# Patient Record
Sex: MALE | Race: WHITE | NOT HISPANIC OR LATINO | URBAN - METROPOLITAN AREA
[De-identification: names, ages, dates, MRNs, and addresses within clinical notes are randomized per-mention and may not be internally consistent; named-entity substitution may affect disease eponyms.]

---

## 2017-01-12 ENCOUNTER — OUTPATIENT (OUTPATIENT)
Dept: OUTPATIENT SERVICES | Facility: HOSPITAL | Age: 52
LOS: 1 days | End: 2017-01-12
Payer: MEDICARE

## 2017-01-12 DIAGNOSIS — K08.9 DISORDER OF TEETH AND SUPPORTING STRUCTURES, UNSPECIFIED: ICD-10-CM

## 2017-01-12 PROCEDURE — D1110: CPT

## 2017-01-17 DIAGNOSIS — Z01.20 ENCOUNTER FOR DENTAL EXAMINATION AND CLEANING WITHOUT ABNORMAL FINDINGS: ICD-10-CM

## 2017-02-22 ENCOUNTER — OUTPATIENT (OUTPATIENT)
Dept: OUTPATIENT SERVICES | Facility: HOSPITAL | Age: 52
LOS: 1 days | End: 2017-02-22
Payer: MEDICARE

## 2017-02-22 DIAGNOSIS — K08.9 DISORDER OF TEETH AND SUPPORTING STRUCTURES, UNSPECIFIED: ICD-10-CM

## 2017-02-22 PROCEDURE — D7140: CPT

## 2017-02-23 DIAGNOSIS — K02.9 DENTAL CARIES, UNSPECIFIED: ICD-10-CM

## 2017-03-15 ENCOUNTER — OUTPATIENT (OUTPATIENT)
Dept: OUTPATIENT SERVICES | Facility: HOSPITAL | Age: 52
LOS: 1 days | End: 2017-03-15

## 2017-03-17 DIAGNOSIS — K08.9 DISORDER OF TEETH AND SUPPORTING STRUCTURES, UNSPECIFIED: ICD-10-CM

## 2017-03-29 ENCOUNTER — OUTPATIENT (OUTPATIENT)
Dept: OUTPATIENT SERVICES | Facility: HOSPITAL | Age: 52
LOS: 1 days | End: 2017-03-29
Payer: MEDICARE

## 2017-03-29 DIAGNOSIS — K08.9 DISORDER OF TEETH AND SUPPORTING STRUCTURES, UNSPECIFIED: ICD-10-CM

## 2017-03-29 PROCEDURE — D7140: CPT

## 2017-03-31 DIAGNOSIS — K02.9 DENTAL CARIES, UNSPECIFIED: ICD-10-CM

## 2017-04-05 ENCOUNTER — OUTPATIENT (OUTPATIENT)
Dept: OUTPATIENT SERVICES | Facility: HOSPITAL | Age: 52
LOS: 1 days | End: 2017-04-05
Payer: MEDICARE

## 2017-04-05 DIAGNOSIS — K02.9 DENTAL CARIES, UNSPECIFIED: ICD-10-CM

## 2017-04-05 DIAGNOSIS — K08.9 DISORDER OF TEETH AND SUPPORTING STRUCTURES, UNSPECIFIED: ICD-10-CM

## 2017-04-05 PROCEDURE — D7140: CPT

## 2017-04-12 ENCOUNTER — OUTPATIENT (OUTPATIENT)
Dept: OUTPATIENT SERVICES | Facility: HOSPITAL | Age: 52
LOS: 1 days | End: 2017-04-12

## 2017-04-12 DIAGNOSIS — K08.9 DISORDER OF TEETH AND SUPPORTING STRUCTURES, UNSPECIFIED: ICD-10-CM

## 2017-05-04 ENCOUNTER — OUTPATIENT (OUTPATIENT)
Dept: OUTPATIENT SERVICES | Facility: HOSPITAL | Age: 52
LOS: 1 days | End: 2017-05-04

## 2017-05-09 DIAGNOSIS — K08.9 DISORDER OF TEETH AND SUPPORTING STRUCTURES, UNSPECIFIED: ICD-10-CM

## 2017-09-28 ENCOUNTER — OUTPATIENT (OUTPATIENT)
Dept: OUTPATIENT SERVICES | Facility: HOSPITAL | Age: 52
LOS: 1 days | End: 2017-09-28
Payer: MEDICARE

## 2017-09-28 DIAGNOSIS — K08.9 DISORDER OF TEETH AND SUPPORTING STRUCTURES, UNSPECIFIED: ICD-10-CM

## 2017-09-28 PROCEDURE — D1110: CPT

## 2017-09-28 PROCEDURE — D0230: CPT

## 2017-09-28 PROCEDURE — D0120: CPT

## 2017-09-28 PROCEDURE — D0220: CPT

## 2017-09-28 PROCEDURE — D0272: CPT

## 2017-10-02 DIAGNOSIS — Z01.20 ENCOUNTER FOR DENTAL EXAMINATION AND CLEANING WITHOUT ABNORMAL FINDINGS: ICD-10-CM

## 2021-07-04 ENCOUNTER — EMERGENCY (EMERGENCY)
Facility: HOSPITAL | Age: 56
LOS: 1 days | Discharge: HOME | End: 2021-07-04
Attending: EMERGENCY MEDICINE | Admitting: EMERGENCY MEDICINE
Payer: MEDICARE

## 2021-07-04 VITALS
WEIGHT: 186.95 LBS | DIASTOLIC BLOOD PRESSURE: 77 MMHG | RESPIRATION RATE: 16 BRPM | HEIGHT: 70.5 IN | TEMPERATURE: 97 F | SYSTOLIC BLOOD PRESSURE: 153 MMHG | OXYGEN SATURATION: 97 % | HEART RATE: 72 BPM

## 2021-07-04 LAB
ANION GAP SERPL CALC-SCNC: 5 MMOL/L — SIGNIFICANT CHANGE UP (ref 5–17)
BASOPHILS # BLD AUTO: 0.05 K/UL — SIGNIFICANT CHANGE UP (ref 0–0.2)
BASOPHILS NFR BLD AUTO: 0.5 % — SIGNIFICANT CHANGE UP (ref 0–2)
BUN SERPL-MCNC: 15 MG/DL — SIGNIFICANT CHANGE UP (ref 7–23)
CALCIUM SERPL-MCNC: 11.2 MG/DL — HIGH (ref 8.4–10.5)
CHLORIDE SERPL-SCNC: 102 MMOL/L — SIGNIFICANT CHANGE UP (ref 96–108)
CO2 SERPL-SCNC: 27 MMOL/L — SIGNIFICANT CHANGE UP (ref 22–31)
CREAT SERPL-MCNC: 0.97 MG/DL — SIGNIFICANT CHANGE UP (ref 0.5–1.3)
EOSINOPHIL # BLD AUTO: 0.38 K/UL — SIGNIFICANT CHANGE UP (ref 0–0.5)
EOSINOPHIL NFR BLD AUTO: 4.2 % — SIGNIFICANT CHANGE UP (ref 0–6)
GLUCOSE SERPL-MCNC: 101 MG/DL — HIGH (ref 70–99)
HCT VFR BLD CALC: 40.5 % — SIGNIFICANT CHANGE UP (ref 39–50)
HGB BLD-MCNC: 14 G/DL — SIGNIFICANT CHANGE UP (ref 13–17)
IMM GRANULOCYTES NFR BLD AUTO: 0.7 % — SIGNIFICANT CHANGE UP (ref 0–1.5)
LYMPHOCYTES # BLD AUTO: 1.97 K/UL — SIGNIFICANT CHANGE UP (ref 1–3.3)
LYMPHOCYTES # BLD AUTO: 21.6 % — SIGNIFICANT CHANGE UP (ref 13–44)
MCHC RBC-ENTMCNC: 31.7 PG — SIGNIFICANT CHANGE UP (ref 27–34)
MCHC RBC-ENTMCNC: 34.6 GM/DL — SIGNIFICANT CHANGE UP (ref 32–36)
MCV RBC AUTO: 91.8 FL — SIGNIFICANT CHANGE UP (ref 80–100)
MONOCYTES # BLD AUTO: 0.78 K/UL — SIGNIFICANT CHANGE UP (ref 0–0.9)
MONOCYTES NFR BLD AUTO: 8.6 % — SIGNIFICANT CHANGE UP (ref 2–14)
NEUTROPHILS # BLD AUTO: 5.87 K/UL — SIGNIFICANT CHANGE UP (ref 1.8–7.4)
NEUTROPHILS NFR BLD AUTO: 64.4 % — SIGNIFICANT CHANGE UP (ref 43–77)
NRBC # BLD: 0 /100 WBCS — SIGNIFICANT CHANGE UP (ref 0–0)
PLATELET # BLD AUTO: 193 K/UL — SIGNIFICANT CHANGE UP (ref 150–400)
POTASSIUM SERPL-MCNC: 4.5 MMOL/L — SIGNIFICANT CHANGE UP (ref 3.5–5.3)
POTASSIUM SERPL-SCNC: 4.5 MMOL/L — SIGNIFICANT CHANGE UP (ref 3.5–5.3)
RBC # BLD: 4.41 M/UL — SIGNIFICANT CHANGE UP (ref 4.2–5.8)
RBC # FLD: 13.3 % — SIGNIFICANT CHANGE UP (ref 10.3–14.5)
SODIUM SERPL-SCNC: 134 MMOL/L — LOW (ref 135–145)
WBC # BLD: 9.11 K/UL — SIGNIFICANT CHANGE UP (ref 3.8–10.5)
WBC # FLD AUTO: 9.11 K/UL — SIGNIFICANT CHANGE UP (ref 3.8–10.5)

## 2021-07-04 PROCEDURE — 36415 COLL VENOUS BLD VENIPUNCTURE: CPT

## 2021-07-04 PROCEDURE — 73562 X-RAY EXAM OF KNEE 3: CPT | Mod: 26,LT

## 2021-07-04 PROCEDURE — G1004: CPT

## 2021-07-04 PROCEDURE — 90715 TDAP VACCINE 7 YRS/> IM: CPT

## 2021-07-04 PROCEDURE — 70450 CT HEAD/BRAIN W/O DYE: CPT | Mod: MF

## 2021-07-04 PROCEDURE — 73030 X-RAY EXAM OF SHOULDER: CPT

## 2021-07-04 PROCEDURE — 12013 RPR F/E/E/N/L/M 2.6-5.0 CM: CPT

## 2021-07-04 PROCEDURE — 99283 EMERGENCY DEPT VISIT LOW MDM: CPT | Mod: 25

## 2021-07-04 PROCEDURE — 93010 ELECTROCARDIOGRAM REPORT: CPT

## 2021-07-04 PROCEDURE — 96360 HYDRATION IV INFUSION INIT: CPT | Mod: XU

## 2021-07-04 PROCEDURE — 73030 X-RAY EXAM OF SHOULDER: CPT | Mod: 26,LT

## 2021-07-04 PROCEDURE — 85025 COMPLETE CBC W/AUTO DIFF WBC: CPT

## 2021-07-04 PROCEDURE — 70450 CT HEAD/BRAIN W/O DYE: CPT | Mod: 26,MF

## 2021-07-04 PROCEDURE — 13132 CMPLX RPR F/C/C/M/N/AX/G/H/F: CPT

## 2021-07-04 PROCEDURE — 73020 X-RAY EXAM OF SHOULDER: CPT

## 2021-07-04 PROCEDURE — 93005 ELECTROCARDIOGRAM TRACING: CPT

## 2021-07-04 PROCEDURE — 73562 X-RAY EXAM OF KNEE 3: CPT

## 2021-07-04 PROCEDURE — 80048 BASIC METABOLIC PNL TOTAL CA: CPT

## 2021-07-04 PROCEDURE — 90471 IMMUNIZATION ADMIN: CPT

## 2021-07-04 PROCEDURE — 99284 EMERGENCY DEPT VISIT MOD MDM: CPT | Mod: 25

## 2021-07-04 RX ORDER — DIVALPROEX SODIUM 500 MG/1
250 TABLET, DELAYED RELEASE ORAL ONCE
Refills: 0 | Status: COMPLETED | OUTPATIENT
Start: 2021-07-04 | End: 2021-07-04

## 2021-07-04 RX ORDER — TETANUS TOXOID, REDUCED DIPHTHERIA TOXOID AND ACELLULAR PERTUSSIS VACCINE, ADSORBED 5; 2.5; 8; 8; 2.5 [IU]/.5ML; [IU]/.5ML; UG/.5ML; UG/.5ML; UG/.5ML
0.5 SUSPENSION INTRAMUSCULAR ONCE
Refills: 0 | Status: COMPLETED | OUTPATIENT
Start: 2021-07-04 | End: 2021-07-04

## 2021-07-04 RX ORDER — SODIUM CHLORIDE 9 MG/ML
500 INJECTION INTRAMUSCULAR; INTRAVENOUS; SUBCUTANEOUS ONCE
Refills: 0 | Status: COMPLETED | OUTPATIENT
Start: 2021-07-04 | End: 2021-07-04

## 2021-07-04 RX ORDER — DIVALPROEX SODIUM 500 MG/1
500 TABLET, DELAYED RELEASE ORAL ONCE
Refills: 0 | Status: COMPLETED | OUTPATIENT
Start: 2021-07-04 | End: 2021-07-04

## 2021-07-04 RX ADMIN — SODIUM CHLORIDE 1000 MILLILITER(S): 9 INJECTION INTRAMUSCULAR; INTRAVENOUS; SUBCUTANEOUS at 08:26

## 2021-07-04 RX ADMIN — TETANUS TOXOID, REDUCED DIPHTHERIA TOXOID AND ACELLULAR PERTUSSIS VACCINE, ADSORBED 0.5 MILLILITER(S): 5; 2.5; 8; 8; 2.5 SUSPENSION INTRAMUSCULAR at 08:24

## 2021-07-04 RX ADMIN — DIVALPROEX SODIUM 500 MILLIGRAM(S): 500 TABLET, DELAYED RELEASE ORAL at 08:25

## 2021-07-04 RX ADMIN — DIVALPROEX SODIUM 250 MILLIGRAM(S): 500 TABLET, DELAYED RELEASE ORAL at 08:25

## 2021-07-04 RX ADMIN — SODIUM CHLORIDE 500 MILLILITER(S): 9 INJECTION INTRAMUSCULAR; INTRAVENOUS; SUBCUTANEOUS at 09:00

## 2021-07-04 NOTE — ED PROVIDER NOTE - OBJECTIVE STATEMENT
Pt juan c, has hx seizures. Lives in group home, visiting parents for weekend. pt had seizure and fell down a few steps at 7am. had loc. post ictal as per mom and ems upon their arrival. mom states patient last seizure was few weeks ago. pt takes all meds, has not taken this morning meds, scheduled for 8am. pt c/o bleeding and pain over right eyebrow and left shoulder and knee pain. does not remember falling, states he had a seizure. no fever, chills, travel. no cp, no sob. pt at baseline as per mom. tdap unknown. Pt juan c, has hx seizures. Lives in group home, visiting parents for weekend. pt's mom heard noise and pt was at bottom of a few steps at 7am. had loc. post ictal as per mom and ems upon their arrival. mom states patient last seizure was few weeks ago. pt takes all meds, has not taken this morning meds, scheduled for 8am. pt c/o bleeding and pain over right eyebrow and left shoulder and knee pain. does not remember falling, states he had a seizure. no fever, chills, travel. no cp, no sob. pt at baseline as per mom. tdap unknown.

## 2021-07-04 NOTE — ED PROVIDER NOTE - NS ED ROS FT
Except as otherwise indicated in HPI:  CONSTITUTIONAL: Neg  HEENT: neg  CV: neg  Resp: neg  GI: Neg  : Neg  MSK: Neg  SKIN: Neg  NEURO: seizure  PSYCHIATRIC: Neg  Heme/Onc: Neg

## 2021-07-04 NOTE — ED PROVIDER NOTE - PROGRESS NOTE DETAILS
pt has not taken 8am meds but episode was at 7am; pt takes vimpat 100mg bid (8a, 8p) and depakote 500mg tid (8a, 4p, 8p) took all meds last night   pt will take all 8am meds here (brought labeled from home) and will give depakote in ED (pt doesn't have any left, was going back to group home for meds, didn't send enough) and will give extra 250mg depakote for total dose 750mg

## 2021-07-04 NOTE — ED PROVIDER NOTE - PATIENT PORTAL LINK FT
You can access the FollowMyHealth Patient Portal offered by [facility] by registering at the following website: [email or web address]. By joining [employer]’s FollowMyHealth portal, you will also be able to view your health information using other applications (apps) compatible with our system.

## 2021-07-04 NOTE — ED PROCEDURE NOTE - PROCEDURE ADDITIONAL DETAILS
2 chromic gut fascia sutures interrupted  7 skin in terrupted 5-0 nylon sutures  steristrips applied

## 2021-07-04 NOTE — ED PROVIDER NOTE - CLINICAL SUMMARY MEDICAL DECISION MAKING FREE TEXT BOX
Pt juan c, has hx seizures. Lives in group home, visiting parents for weekend. pt had seizure and fell down a few steps at 7am. had loc. post ictal as per mom and ems upon their arrival. mom states patient last seizure was few weeks ago. pt takes all meds, has not taken this morning meds, scheduled for 8am. pt c/o bleeding and pain over right eyebrow and left shoulder and knee pain. does not remember falling, states he had a seizure. no fever, chills, travel. no cp, no sob. pt at baseline as per mom.    laceration repair, tdap, ct brain, xrays, seizure medication  re-assess, likely d/c back to home  pt mom at bedside Pt juan c, has hx seizures. Lives in group home, visiting parents for weekend. pt's mom heard noise and pt was at bottom of a few steps at 7am. had loc. post ictal as per mom and ems upon their arrival. mom states patient last seizure was few weeks ago. pt takes all meds, has not taken this morning meds, scheduled for 8am. pt c/o bleeding and pain over right eyebrow and left shoulder and knee pain. does not remember falling, states he had a seizure. no fever, chills, travel. no cp, no sob. pt at baseline as per mom. tdap unknown.    laceration repair, tdap, ct brain, xrays, seizure medication  re-assess, likely d/c back to home  pt mom at bedside

## 2021-07-04 NOTE — ED PROVIDER NOTE - PHYSICAL EXAMINATION
Gen:  alert, awake, no acute distress  Head:  normocephalic; 3cm laceration right eyebrow  HEENT: PERRLA, EOMI, normal nose, normal oropharynx, no tonsillar edema, erythema, or exudate  CV:  rrr, nl S1, S2, no m/r/g  Pulm:  lungs CTA b/l  Abd: s/nt/nd, +BS  MSK:  moving all extremities, full rom left shoulder, mild anterior ttp; no back midline ttp, no stepoffs, no cva TTP; abrasion over left knee, full rom  Neuro:  grossly intact, no focal deficits  Skin:  clear, dry, intact  Psych: AOx3, normal affect, no apparent risk to self or others Gen:  alert, awake, no acute distress  Head:  normocephalic; 3cm laceration right eyebrow  HEENT: PERRLA, EOMI, normal nose, normal oropharynx, no tonsillar edema, erythema, or exudate  CV:  rrr, nl S1, S2, no m/r/g  Pulm:  lungs CTA b/l  Abd: s/nt/nd, +BS  MSK:  moving all extremities, full rom left shoulder, mild anterior ttp; no back midline ttp, no stepoffs, no cva TTP; abrasion over left knee medial patella, full rom; no ttp tib/fib  Neuro:  grossly intact, no focal deficits  Skin:  clear, dry, intact  Psych: AOx3, normal affect, no apparent risk to self or others Gen:  alert, awake, no acute distress  Head:  normocephalic; 3cm laceration right eyebrow  HEENT: PERRLA, EOMI, normal nose, normal oropharynx, no tonsillar edema, erythema, or exudate; right upper ear deyanira hematoma (unchanged during visit, non expanding)  CV:  rrr, nl S1, S2, no m/r/g  Pulm:  lungs CTA b/l  Abd: s/nt/nd, +BS  MSK:  moving all extremities, full rom left shoulder, mild anterior ttp; no back midline ttp, no stepoffs, no cva TTP; abrasion over left knee medial patella, full rom; no ttp tib/fib  Neuro:  grossly intact, no focal deficits  Skin:  clear, dry, intact  Psych: AOx3, normal affect, no apparent risk to self or others

## 2021-07-04 NOTE — ED ADULT NURSE NOTE - NS ED NOTE  TALK SOMEONE YN
Visit Information Date & Time Provider Department Dept. Phone Encounter #  
 10/12/2017  8:00 AM Zari Forde MD [facility] [address]  [id number] Your Appointments 10/18/2017  3:00 PM  
ECHO CARDIOGRAMS 2D with ECHO, STFRANCIS  
[facility] ([place] SCHEDULING) Appt Note: stress echo with BP chk pls tell Brent Mal [address] [facility]  
[phone]  
  
   
 [address] [address]  
  
    
 10/18/2017  3:00 PM  
STRESS ECHOCARDIOGRAMS with SLOANE GIANG  
[facility] ([facility]) Appt Note: stress echo with BP chk pls tell Crouse Mal [address] [place] [address]  
[phone]  
  
   
 [address] [facility] [address] [address] [address] [place]  
  
    
 11/28/2017  1:40 PM  
Follow Up with Meghan Gómez PsyD [address] [place] [address] ([facility]) Appt Note: office feedback. ..[address] [place] [address] [phone] [address] [address]  
  
    
 12/7/2017  9:40 AM  
ESTABLISHED PATIENT with Kelly Ballard MD  
[facility] ([place] SCHEDULING) Appt Note: 6 wk fup  
 [address] [facility] [address]  
[address] [facility] [address]  
  
    
 10/9/2018  8:30 AM  
ROUTINE CARE with MD Danyell [facility]) Appt Note: 1yr f/u thyroid cp0.00  
 [address] [place] [address]  
[address] [address] [facility] [address] [address] Upcoming Health Maintenance Date Due  
 PAP AKA CERVICAL CYTOLOGY 7/23/2018 BREAST CANCER SCRN MAMMOGRAM 9/18/2019 DTaP/Tdap/Td series (2 - Td) 10/26/2019 COLONOSCOPY 11/1/2019 Allergies as of 10/12/2017  Review Complete On: 10/12/2017 By: Edson Torres MD  
  
 Severity Noted Reaction Type Reactions Adhesive  09/14/2009    Hives Aloe Vera  08/02/2017    Hives Corticosteroids (Glucocorticoids)  06/19/2015    Rash Cymbalta [Duloxetine]  11/14/2012   Systemic Vertigo Pt gets vertigo Darvon [Propoxyphene]  09/14/2009    Rash Lyrica [Pregabalin]  08/02/2012   Side Effect Vertigo Other Medication  04/09/2014    Other (comments) Steroid injections caused facial redness Oxycodone  04/09/2014    Other (comments)  
 hallucinations Pcn [Penicillins]  09/14/2009    Contact Dermatitis OK with Keflex/Ancef 4/16/14 Prednisone  11/14/2012   Systemic Rash Tetracycline  09/14/2009    Other (comments)  
 headache Tramadol  12/30/2014    Rash Vancomycin  12/30/2014    Rash  
 redness Dilaudid [Hydromorphone (Pf)] Low 03/07/2016   Side Effect Nausea and Vomiting, Vertigo Current Immunizations  Reviewed on 5/19/2016 Name Date PPD 10/26/2009 TDAP Vaccine 10/26/2009 Not reviewed this visit You Were Diagnosed With   
  
 Codes Comments Severe depression ([facility]    -  Primary ICD-10-CM: F32.2 ICD-9-CM: 932 Anxiety     ICD-10-CM: F41.9 ICD-9-CM: 300.00 Attention and concentration deficit     ICD-10-CM: R41.840 ICD-9-CM: 799.51 Somatization disorder     ICD-10-CM: F45.0 ICD-9-CM: 300.81 Somnolence, daytime     ICD-10-CM: R40.0 ICD-9-CM: 780.54 Vitals BP Pulse Temp Resp Height(growth percentile) Weight(growth percentile) 106/72 (BP 1 Location: Left arm, BP Patient Position: Sitting) 63 97.8 °F (36.6 °C) (Oral) 12 5' 3\" (1.6 m) 168 lb 12.8 oz (76.6 kg) BMI OB Status Smoking Status 29.9 kg/m2 Hysterectomy Former Smoker Vitals History BMI and BSA Data  Body Mass Index Body Surface Area  
 29.9 kg/m 2 1.85 m 2  
  
 Preferred Pharmacy Pharmacy Name Phone [address] [facility] [phone] Your Updated Medication List  
  
   
This list is accurate as of: 10/12/17  8:43 AM.  Always use your most recent med list.  
  
  
  
  
 albuterol 90 mcg/actuation inhaler Commonly known as:  PROAIR HFA Take 2 Puffs by inhalation every four (4) hours as needed for Wheezing or Shortness of Breath. atorvastatin 10 mg tablet Commonly known as:  LIPITOR Cholecalciferol (Vitamin D3) 50,000 unit Cap Take  by mouth two (2) times a week. diclofenac EC 75 mg EC tablet Commonly known as:  VOLTAREN Take 75 mg by mouth two (2) times a day. liothyronine 5 mcg tablet Commonly known as:  CYTOMEL Take 1 Tab by mouth daily. midodrine 2.5 mg tablet Commonly known as:  Melissa Nailer Take 1 Tab by mouth two (2) times a day for 30 days. pantoprazole 40 mg tablet Commonly known as:  PROTONIX Take 1 Tab by mouth two (2) times a day. Indications: gastroesophageal reflux disease PLAQUENIL 200 mg tablet Generic drug:  hydroxychloroquine Take 200 mg by mouth two (2) times a day. TIROSINT 75 mcg Cap Generic drug:  Levothyroxine TAKE 1 CAPSULE DAILY Introducing [facility]! Dear TERI WILSON [facility]: Thank you for requesting a [employer] account. Our records indicate that you already have an active [employer] account. You can access your account anytime at [email or web address] Did you know that you can access your hospital and ER discharge instructions at any time in [employer] You can also review all of your test results from your hospital stay or ER visit. Additional Information If you have questions, please visit the Frequently Asked Questions section of the [employer] website at [email or web address] Remember, [employer] is NOT to be used for urgent needs.  For medical emergencies, dial 911. Now available from your iPhone and Android! Please provide this summary of care documentation to your next provider. Your primary care clinician is listed as Tiffany Johnson. If you have any questions after today's visit, please call [phone]. No

## 2021-07-04 NOTE — ED ADULT NURSE NOTE - NSIMPLEMENTINTERV_GEN_ALL_ED
Implemented All Fall Risk Interventions:  [place] to call system. Call bell, personal items and telephone within reach. Instruct patient to call for assistance. Room bathroom lighting operational. Non-slip footwear when patient is off stretcher. Physically safe environment: no spills, clutter or unnecessary equipment. Stretcher in lowest position, wheels locked, appropriate side rails in place. Provide visual cue, wrist band, yellow gown, etc. Monitor gait and stability. Monitor for mental status changes and reorient to person, place, and time. Review medications for side effects contributing to fall risk. Reinforce activity limits and safety measures with patient and family.

## 2021-07-04 NOTE — ED PROVIDER NOTE - CARE PLAN
Principal Discharge DX:	Seizure  Secondary Diagnosis:	Laceration of eyebrow and forehead, right, initial encounter

## 2021-07-04 NOTE — ED PROVIDER NOTE - NSFOLLOWUPINSTRUCTIONS_ED_ALL_ED_FT
Seizure, Adult      A seizure is a sudden burst of abnormal electrical activity in the brain. Seizures usually last from 30 seconds to 2 minutes. They can cause many different symptoms.    Usually, seizures are not harmful unless they last a long time.      What are the causes?  Common causes of this condition include:  •Fever or infection.    •Conditions that affect the brain, such as:  •A brain or head injury.      •Bleeding in the brain.      •A tumor.      •Stroke.         •Low blood sugar.       •Conditions that are passed from parent to child (are inherited).    •Problems with substances, such as:  •Having a reaction to a drug or a medicine.      •Suddenly stopping the use of a substance (withdrawal).        •Disorders such as autism or cerebral palsy.      In some cases, the cause may not be known. A person who has repeated seizures over time without a clear cause has a condition called epilepsy.      What increases the risk?  You are more likely to get this condition if you have:  •A family history of epilepsy.       •Had a seizure in the past.      •A history of head injury, lack of oxygen at birth, or strokes.        What are the signs or symptoms?    There are many types of seizures. The symptoms vary depending on the type of seizure you have.    Symptoms during a seizure     •Shaking (convulsions).      •Stiffness in the body.      •Passing out, or losing consciousness.      •Head nodding.      •Staring.       •Not responding to sound or touch.      •Loss of bladder control and bowel control.       Symptoms before a seizure     •Fear.      •Worry (anxiety).      •Feeling like you may vomit.      •Feeling like the room is spinning (vertigo).      •Feeling like you saw or heard something before (déjà vu).      •Odd tastes or smells.      •Changes in how you see. You may see flashing lights or spots.      Symptoms after a seizure     •Confusion.       •Sleepiness.       •Headache.       •Weakness on one side of the body.        How is this treated?  Most seizures will stop on their own in under 5 minutes. In these cases, no treatment is needed. Seizures that last longer than 5 minutes will usually need treatment. Treatment can include:  •Medicines given through an IV tube.      •Avoiding things that are known to cause your seizures. These can include medicines that you take for another condition.      •Medicines to treat epilepsy.      •Surgery to stop the seizures. This may be needed if medicines do not help.        Follow these instructions at home:    Medicines     •Take over-the-counter and prescription medicines only as told by your doctor.      • Do not eat or drink anything that may keep your medicine from working, such as alcohol.      Activity     • Do not do any activities that would be dangerous if you had another seizure, like driving or swimming. Wait until your doctor says it is safe for you to do them.      •If you live in the [place] ask your local DMV when you can drive.      •Get plenty of rest. Lack of sleep can make seizures more likely to occur.        Teaching others   Teach friends and family what to do when you have a seizure. They should:  •Lay you on the ground.      •Protect your head and body.      •Loosen any tight clothing around your neck.      •Turn you on your side.      •Not hold you down.      •Not put anything into your mouth.    •Know whether or not you need emergency care. For example, they should get help right away if:  •You have a seizure that lasts longer than 5 minutes.      •You have several seizures that follow one another.        •Stay with you until you are better.      General instructions     •Contact your doctor each time you have a seizure.      •Avoid anything that gives you seizures.    •Keep a seizure diary. Write down:  •What you think caused each seizure.      •What you remember about each seizure.        •Keep all follow-up visits as told by your doctor. This is important.        Contact a doctor if:    •You have another seizure.      •You have seizures more often.      •There is any change in what happens during your seizures.      •You keep having seizures with treatment.      •You have symptoms of being sick or having an infection. This may make a seizure more likely to happen.        Get help right away if:  •You have a seizure that:  •Lasts longer than 5 minutes.      •Is different than seizures you had before.      •Makes it harder to breathe.      •Happens after you hurt your head.      •You have any of these symptoms after a seizure:  •Not being able to speak.      •Not being able to use a part of your body.      •Confusion.      •A bad headache.        •You have two or more seizures in a row.      •You do not wake up right after a seizure.      •You get hurt during a seizure.      These symptoms may be an emergency. Do not wait to see if the symptoms will go away. Get medical help right away. Call your local emergency services (911 in the [place] Do not drive yourself to the hospital.       Summary    •A seizure is a sudden burst of abnormal electrical activity in the brain. Seizures usually last from 30 seconds to 2 minutes. They are not harmful unless they last a long time.      •Causes of seizures include illnesses, medicines, conditions that are passed from parent to child, injury to your head, strokes, drug abuse, or growths or tumors.      • Do not eat or drink anything that may keep your medicine from working, such as alcohol.      •Teach friends and family what to do when you have a seizure.      •Contact your doctor each time you have a seizure. Seizure, Adult      A seizure is a sudden burst of abnormal electrical activity in the brain. Seizures usually last from 30 seconds to 2 minutes. They can cause many different symptoms.    Usually, seizures are not harmful unless they last a long time.      What are the causes?  Common causes of this condition include:  •Fever or infection.    •Conditions that affect the brain, such as:  •A brain or head injury.      •Bleeding in the brain.      •A tumor.      •Stroke.         •Low blood sugar.       •Conditions that are passed from parent to child (are inherited).    •Problems with substances, such as:  •Having a reaction to a drug or a medicine.      •Suddenly stopping the use of a substance (withdrawal).        •Disorders such as autism or cerebral palsy.      In some cases, the cause may not be known. A person who has repeated seizures over time without a clear cause has a condition called epilepsy.      What increases the risk?  You are more likely to get this condition if you have:  •A family history of epilepsy.       •Had a seizure in the past.      •A history of head injury, lack of oxygen at birth, or strokes.        What are the signs or symptoms?    There are many types of seizures. The symptoms vary depending on the type of seizure you have.    Symptoms during a seizure     •Shaking (convulsions).      •Stiffness in the body.      •Passing out, or losing consciousness.      •Head nodding.      •Staring.       •Not responding to sound or touch.      •Loss of bladder control and bowel control.       Symptoms before a seizure     •Fear.      •Worry (anxiety).      •Feeling like you may vomit.      •Feeling like the room is spinning (vertigo).      •Feeling like you saw or heard something before (déjà vu).      •Odd tastes or smells.      •Changes in how you see. You may see flashing lights or spots.      Symptoms after a seizure     •Confusion.       •Sleepiness.       •Headache.       •Weakness on one side of the body.        How is this treated?  Most seizures will stop on their own in under 5 minutes. In these cases, no treatment is needed. Seizures that last longer than 5 minutes will usually need treatment. Treatment can include:  •Medicines given through an IV tube.      •Avoiding things that are known to cause your seizures. These can include medicines that you take for another condition.      •Medicines to treat epilepsy.      •Surgery to stop the seizures. This may be needed if medicines do not help.        Follow these instructions at home:    Medicines     •Take over-the-counter and prescription medicines only as told by your doctor.      • Do not eat or drink anything that may keep your medicine from working, such as alcohol.      Activity     • Do not do any activities that would be dangerous if you had another seizure, like driving or swimming. Wait until your doctor says it is safe for you to do them.      •If you live in the [place] ask your local DMV when you can drive.      •Get plenty of rest. Lack of sleep can make seizures more likely to occur.        Teaching others   Teach friends and family what to do when you have a seizure. They should:  •Lay you on the ground.      •Protect your head and body.      •Loosen any tight clothing around your neck.      •Turn you on your side.      •Not hold you down.      •Not put anything into your mouth.    •Know whether or not you need emergency care. For example, they should get help right away if:  •You have a seizure that lasts longer than 5 minutes.      •You have several seizures that follow one another.        •Stay with you until you are better.      General instructions     •Contact your doctor each time you have a seizure.      •Avoid anything that gives you seizures.    •Keep a seizure diary. Write down:  •What you think caused each seizure.      •What you remember about each seizure.        •Keep all follow-up visits as told by your doctor. This is important.        Contact a doctor if:    •You have another seizure.      •You have seizures more often.      •There is any change in what happens during your seizures.      •You keep having seizures with treatment.      •You have symptoms of being sick or having an infection. This may make a seizure more likely to happen.        Get help right away if:  •You have a seizure that:  •Lasts longer than 5 minutes.      •Is different than seizures you had before.      •Makes it harder to breathe.      •Happens after you hurt your head.      •You have any of these symptoms after a seizure:  •Not being able to speak.      •Not being able to use a part of your body.      •Confusion.      •A bad headache.        •You have two or more seizures in a row.      •You do not wake up right after a seizure.      •You get hurt during a seizure.      These symptoms may be an emergency. Do not wait to see if the symptoms will go away. Get medical help right away. Call your local emergency services (911 in the [place] Do not drive yourself to the hospital.       Summary    •A seizure is a sudden burst of abnormal electrical activity in the brain. Seizures usually last from 30 seconds to 2 minutes. They are not harmful unless they last a long time.      •Causes of seizures include illnesses, medicines, conditions that are passed from parent to child, injury to your head, strokes, drug abuse, or growths or tumors.      • Do not eat or drink anything that may keep your medicine from working, such as alcohol.      •Teach friends and family what to do when you have a seizure.      •Contact your doctor each time you have a seizure.      Laceration    WHAT YOU NEED TO KNOW:    A laceration is an injury to the skin and the soft tissue underneath it. Lacerations can happen anywhere on the body.    DISCHARGE INSTRUCTIONS:    Return to the emergency department if:   •You have heavy bleeding or bleeding that does not stop after 10 minutes of holding firm, direct pressure over the wound.      •Your wound opens up.      Call your doctor if:   •You have a fever or chills.      •Your laceration is red, warm, or swollen.      •You have red streaks on your skin coming from your wound.      •You have white or yellow drainage from the wound that smells bad.      •You have pain that gets worse, even after treatment.      •You have questions or concerns about your condition or care.      Medicines: You may need any of the following:   •Prescription pain medicine may be given. Ask your healthcare provider how to take this medicine safely. Some prescription pain medicines contain acetaminophen. Do not take other medicines that contain acetaminophen without talking to your healthcare provider. Too much acetaminophen may cause liver damage. Prescription pain medicine may cause constipation. Ask your healthcare provider how to prevent or treat constipation.       •Antibiotics help treat or prevent a bacterial infection.      •Take your medicine as directed. Contact your healthcare provider if you think your medicine is not helping or if you have side effects. Tell him or her if you are allergic to any medicine. Keep a list of the medicines, vitamins, and herbs you take. Include the amounts, and when and why you take them. Bring the list or the pill bottles to follow-up visits. Carry your medicine list with you in case of an emergency.      Care for your wound as directed:   •Do not get your wound wet until your healthcare provider says it is okay. Do not soak your wound in water. Do not go swimming until your healthcare provider says it is okay. Carefully wash the wound with soap and water. Gently pat the area dry or allow it to air dry.      •Change your bandages when they get wet, dirty, or after washing. Apply new, clean bandages as directed. Do not apply elastic bandages or tape too tight. Do not put powders or lotions over your incision.      •Apply antibiotic ointment as directed. Your healthcare provider may give you antibiotic ointment to put over your wound if you have stitches. If you have strips of tape over your incision, let them dry up and fall off on their own. If they do not fall off within 14 days, gently remove them. If you have glue over your wound, do not remove or pick at it. If your glue comes off, do not replace it with glue that you have at home.      •Check your wound every day for signs of infection, such as swelling, redness, or pus.      Self-care:   •Apply ice on your wound for 15 to 20 minutes every hour or as directed. Use an ice pack, or put crushed ice in a plastic bag. Cover it with a towel. Ice helps prevent tissue damage and decreases swelling and pain.      •Use a splint as directed. A splint will decrease movement and stress on your wound. It may help it heal faster. A splint may be used for lacerations over joints or areas of your body that bend. Ask your healthcare provider how to apply and remove a splint.      •Decrease scarring of your wound by applying ointments as directed. Do not apply ointments until your healthcare provider says it is okay. You may need to wait until your wound is healed. Ask which ointment to buy and how often to use it. After your wound is healed, use sunscreen over the area when you are out in the sun. You should do this for at least 6 months to 1 year after your injury.      Follow up with your doctor as directed: You may need to follow up in 24 to 48 hours to have your wound checked for infection. You will need to return in 3 to 14 days if you have stitches or staples so they can be removed. Care for your wound as directed to prevent infection and help it heal. Write down your questions so you remember to ask them during your visits.    Keep laceration dry for 24  hours  See your doctor or return to ER in 8-9 days for suture removal.  Return to ER if fever, redness, pus, confusion, seizure or other symptoms  Follow up with your neurologist in 2-3 days.  Take all medication as directed.

## 2021-07-06 RX ORDER — ARIPIPRAZOLE 15 MG/1
1 TABLET ORAL
Qty: 0 | Refills: 0 | DISCHARGE

## 2021-07-06 RX ORDER — PALIPERIDONE 1.5 MG/1
1 TABLET, EXTENDED RELEASE ORAL
Qty: 0 | Refills: 0 | DISCHARGE

## 2021-07-06 RX ORDER — LACOSAMIDE 50 MG/1
0 TABLET ORAL
Qty: 0 | Refills: 0 | DISCHARGE

## 2021-07-06 RX ORDER — ZONISAMIDE 100 MG
1 CAPSULE ORAL
Qty: 0 | Refills: 0 | DISCHARGE

## 2021-11-10 PROBLEM — Z00.00 ENCOUNTER FOR PREVENTIVE HEALTH EXAMINATION: Status: ACTIVE | Noted: 2021-11-10

## 2021-11-10 PROBLEM — G40.909 EPILEPSY, UNSPECIFIED, NOT INTRACTABLE, WITHOUT STATUS EPILEPTICUS: Chronic | Status: ACTIVE | Noted: 2021-07-04

## 2021-12-16 ENCOUNTER — APPOINTMENT (OUTPATIENT)
Dept: PHYSICAL MEDICINE AND REHAB | Facility: CLINIC | Age: 56
End: 2021-12-16
Payer: MEDICARE

## 2021-12-16 VITALS — OXYGEN SATURATION: 98 % | DIASTOLIC BLOOD PRESSURE: 84 MMHG | SYSTOLIC BLOOD PRESSURE: 123 MMHG | HEART RATE: 78 BPM

## 2021-12-16 DIAGNOSIS — R26.9 UNSPECIFIED ABNORMALITIES OF GAIT AND MOBILITY: ICD-10-CM

## 2021-12-16 DIAGNOSIS — F63.81 INTERMITTENT EXPLOSIVE DISORDER: ICD-10-CM

## 2021-12-16 DIAGNOSIS — G40.909 EPILEPSY, UNSPECIFIED, NOT INTRACTABLE, W/OUT STATUS EPILEPTICUS: ICD-10-CM

## 2021-12-16 PROCEDURE — 99204 OFFICE O/P NEW MOD 45 MIN: CPT | Mod: GC

## 2021-12-16 RX ORDER — DOCUSATE SODIUM 100 MG/1
100 CAPSULE, LIQUID FILLED ORAL
Refills: 0 | Status: ACTIVE | COMMUNITY

## 2021-12-16 RX ORDER — PALIPERIDONE 9 MG/1
9 TABLET, EXTENDED RELEASE ORAL
Refills: 0 | Status: ACTIVE | COMMUNITY

## 2021-12-16 RX ORDER — ZONISAMIDE 100 MG/1
100 CAPSULE ORAL
Refills: 0 | Status: ACTIVE | COMMUNITY

## 2021-12-16 RX ORDER — ATORVASTATIN CALCIUM 10 MG/1
10 TABLET, FILM COATED ORAL
Refills: 0 | Status: ACTIVE | COMMUNITY

## 2021-12-16 RX ORDER — BENZTROPINE MESYLATE 1 MG/1
1 TABLET ORAL
Refills: 0 | Status: ACTIVE | COMMUNITY

## 2021-12-16 RX ORDER — DIVALPROEX SODIUM 125 MG/1
125 TABLET, DELAYED RELEASE ORAL
Refills: 0 | Status: ACTIVE | COMMUNITY

## 2021-12-16 RX ORDER — DIVALPROEX SODIUM 500 MG/1
500 TABLET, DELAYED RELEASE ORAL
Refills: 0 | Status: ACTIVE | COMMUNITY

## 2021-12-16 RX ORDER — ARIPIPRAZOLE 2 MG/1
TABLET ORAL
Refills: 0 | Status: ACTIVE | COMMUNITY

## 2021-12-16 RX ORDER — CLONAZEPAM 2 MG/1
TABLET ORAL
Refills: 0 | Status: ACTIVE | COMMUNITY

## 2021-12-16 RX ORDER — LACOSAMIDE 100 MG/1
100 TABLET, FILM COATED ORAL
Refills: 0 | Status: ACTIVE | COMMUNITY

## 2022-01-03 NOTE — ASSESSMENT
[FreeTextEntry1] : 55 yo M PMHx ID, cervical spinal stenosis, epilepsy on Vimpat, divalproex, aripiprazole presents to the clinic for gait issues (worsening balance and slower gait speed) that seemed to have worsened since starting his Vimpat medication July 2021. He is currently being assessed by his neurologist and rheumatologist without significant workup so far, but they are thinking that it could be medication induced. Upon physical examination, patient noted to have bilateral DF weakness R>L and foot slap worse on the right. He is also noted with a stoop posture and bradykinesia but no significant rigidity elicited on PE nor any resting tremors endorsed or seen on this visit. Patient's proprioception seems intact due to no changes in his gait when he was asked to look straight across (thus not looking at his feet). At this time, his gait dysfunction is likely medication induced (pending further assessment by his neurologist) though cervical spinal stenosis/myelopathy may be playing a role.  Patient's care counselor was asked to faxed the results of his workup to the office. In addition, will start patient on PLSO for the right foot to address his foot slap. Patient requires a custom fabrication due to not safely feeding into a prefabricated brace. The condition necessitating the orthosis is expected to be a long-standing duration. Will recommend the use of a RW for his stability when ambulating. Patient to continue his current PT session and will follow up after his brace. \par \par I spent a total of 45 minutes on the date of the encounter evaluating and treating the patient including a discussion of treatment options.

## 2022-01-03 NOTE — HISTORY OF PRESENT ILLNESS
[FreeTextEntry1] : 57 yo M accompanied by his care counselor [facility] ID, cervical spinal stenosis, epilepsy on vimpat, divalproex, aripiprazole presents to the clinic for an "off" gait. As per his care counselor he started Vimpat July 2021 and noticed that his gait worsened after starting the med. He has been noted to be more off balanced and walking slower. He has been able to ambulate without a AD but gait is slow. He had 1 episode of fall July 4 2021 where he fell down the stairs in his mother's house. He went to the ED afterwards where they did imaging without any acute findings. Prior to this his gait was not great but noted to be better than compared to now. He went to a neurologist (Dr. Black) and a rheumatologist -- both last seen 3 weeks ago. He had EEG and EMG done within the past month and currently waiting for the results. MRI brain and spine done within the past month (unsure which parts of the spine) and noted to be normal. Blood test was done by rheumatologist and found to have high calcium levels. As per his care counselor, neurologist and rheumatology still do not have a clear answer to explain his current gait disorder but states that they think could be medication induced. No changes in bowel or bladder function, mood has been stable and no fevers. Denies any resting tremors. Currently in PT for the past 2 years -- currently once a week 1/2 hour session.

## 2022-01-03 NOTE — SOCIAL HISTORY
[Other: _____] : in a [unfilled] [No Device Needed] : Patient doesn't use a device for ambulation [FreeTextEntry1] : Group home for epileptics

## 2022-02-17 NOTE — ED ADULT TRIAGE NOTE - BSA (M2)
2.04 Finasteride Counseling:  I discussed with the patient the risks of use of finasteride including but not limited to decreased libido, decreased ejaculate volume, gynecomastia, and depression. Women should not handle medication.  All of the patient's questions and concerns were addressed. Finasteride Male Counseling: Finasteride Counseling:  I discussed with the patient the risks of use of finasteride including but not limited to decreased libido, decreased ejaculate volume, gynecomastia, and depression. Women should not handle medication.  All of the patient's questions and concerns were addressed.

## 2022-06-09 ENCOUNTER — APPOINTMENT (OUTPATIENT)
Dept: PHYSICAL MEDICINE AND REHAB | Facility: CLINIC | Age: 57
End: 2022-06-09
Payer: MEDICARE

## 2022-06-09 VITALS — HEART RATE: 75 BPM | SYSTOLIC BLOOD PRESSURE: 148 MMHG | OXYGEN SATURATION: 98 % | DIASTOLIC BLOOD PRESSURE: 85 MMHG

## 2022-06-09 DIAGNOSIS — R26.89 OTHER ABNORMALITIES OF GAIT AND MOBILITY: ICD-10-CM

## 2022-06-09 DIAGNOSIS — M48.02 SPINAL STENOSIS, CERVICAL REGION: ICD-10-CM

## 2022-06-09 DIAGNOSIS — F79 UNSPECIFIED INTELLECTUAL DISABILITIES: ICD-10-CM

## 2022-06-09 PROCEDURE — 99213 OFFICE O/P EST LOW 20 MIN: CPT

## 2022-06-09 RX ORDER — LACOSAMIDE 50 MG/1
50 TABLET, FILM COATED ORAL
Qty: 60 | Refills: 0 | Status: ACTIVE | COMMUNITY
Start: 2022-03-28

## 2022-06-09 RX ORDER — POLYETHYLENE GLYCOL 3350 17 G/17G
17 POWDER, FOR SOLUTION ORAL
Qty: 1 | Refills: 0 | Status: ACTIVE | COMMUNITY
Start: 2022-04-13

## 2022-06-09 RX ORDER — CLONAZEPAM 1 MG/1
1 TABLET ORAL
Qty: 90 | Refills: 0 | Status: ACTIVE | COMMUNITY
Start: 2022-03-03

## 2022-06-09 RX ORDER — ARIPIPRAZOLE 30 MG/1
30 TABLET ORAL
Qty: 30 | Refills: 0 | Status: ACTIVE | COMMUNITY
Start: 2022-03-31

## 2022-06-09 RX ORDER — BISACODYL 5 MG/1
5 TABLET ORAL
Qty: 4 | Refills: 0 | Status: ACTIVE | COMMUNITY
Start: 2022-04-13

## 2022-06-09 RX ORDER — CARBAMIDE PEROXIDE 6.5 %
6.5 DROPS OTIC (EAR)
Qty: 15 | Refills: 0 | Status: ACTIVE | COMMUNITY
Start: 2020-07-01

## 2022-06-09 NOTE — HISTORY OF PRESENT ILLNESS
[FreeTextEntry1] : Patient is a 56-year-old male history of ID, cervical spinal stenosis, epilepsy who was last seen December 16, 2021 for a brace evaluation.  Patient received his right TLSO a few months ago.  Patient had difficulty donning the brace initially but after a few months was able to don his brace independently.  Patient has been tolerating the brace well, no skin breakdown, no obvious pain complaints.  No falls reported.  He does report better foot clearance with the AFO.  Functionally the patient's been ambulating with a stand-up walker with arm platforms and supervision.

## 2022-06-09 NOTE — REVIEW OF SYSTEMS
[Difficulty Walking] : difficulty walking [Negative] : Gastrointestinal [Joint Pain] : no joint pain [Skin Wound] : no skin wound

## 2022-06-09 NOTE — ASSESSMENT
[FreeTextEntry1] : Patient is a 56-year-old male history of ID, cervical spinal stenosis, epilepsy who received a right TLSO for some right ankle dorsiflexion weakness and foot slap on the right.  The footplate portion of the brace fits quite well.  The calf portion of the brace is open which does make it easier for him to don the brace however if the strap is not tightened will cause excessive motion and looseness at the calf portion of the brace.  I demonstrated to the aide that while donning the brace to squeeze the calf portion of the brace together and tighten the Velcro strap.  I fear if they revised the calf portion that the patient may have more difficulty donning his brace.  Patient should wear the brace when out of bed.\par \par I spent a total of 20 minutes on the date of the encounter evaluating and treating the patient including a discussion of treatment options with the aide.

## 2022-12-22 ENCOUNTER — APPOINTMENT (OUTPATIENT)
Dept: PHYSICAL MEDICINE AND REHAB | Facility: CLINIC | Age: 57
End: 2022-12-22

## 2023-01-05 ENCOUNTER — APPOINTMENT (OUTPATIENT)
Dept: ENDOCRINOLOGY | Facility: CLINIC | Age: 58
End: 2023-01-05

## 2025-02-19 NOTE — PHYSICAL EXAM
[FreeTextEntry1] : LUMBAR EXAM\par GEN: NAD\par EYES: Sclerae Anicteric. No discharge. EOMI.\par RESP: Breathing unlabored \par CV: DP pulses 2+ and equal. No varicosities noted \par EXT: No C/C/E, no resting tremors noted \par SKIN: No lesions noted \par STRENGTH: 5/5 bilateral elbow flexion, extension, wrist extension and , hip flexors, knee extensors, knee flexors and dorsiflexors. 4/5 DF on the right and 4+/5 DF on the left.  \par TONE: Normal, No clonus, negative Yasmin's bilaterally. (+)Babinski on the right, negative on left\par REFLEXES: 0-1+ symmetric patella, 1/2+ achilles. Plantars downgoing on the left, slightly up on the right \par SENS: Grossly intact to light touch and pinprick bilateral lower extremities, unable to correctly assess proprioception of bilateral LE including the knees but this is most likely from him not understanding the exam due to his cognitive issues \par STANCE: No Trendelenburg with single leg stance \par GAIT: Non antalgic, shallow heel strike on bilateral LE, foot slap present on both but more prominent on the right, stoop postures, diminished arm swing, and bradykinesia noted , upon assess gait with him looking straight forward-- no change in gait or balance -- proprioception thus likely intact \par Toe walk intact with (A), heel walk impaired bilaterally (Rt>Lt)\par \par 
General